# Patient Record
(demographics unavailable — no encounter records)

---

## 2025-03-05 NOTE — HISTORY OF PRESENT ILLNESS
[Pain Location] : pain [] : right knee [3] : a current pain level of 3/10 [Intermit.] : ~He/She~ states the symptoms seem to be intermittent [Bending] : worsened by bending [Walking] : worsened by walking [Rest] : relieved by rest [de-identified] : 03/05/2025: 74-year-old male presents as a new patient here with right knee pain. He states that he's had intermittent pain on the right knee for years, currently  has zero pain. He presents because of a recent flare-up. He was in [place] two weeks ago, he walked in 10k steps. After that time, he had a lot of knee pain, but mainly posterior, since that time it has resolved. Here, as a check-up on his knee, makes sure everything's okay. He's not been seen for it before. Of note, he does have a history of b/l knee replacements. He says his left hip was fantastic, but his right hip--he had some problems with it and feels like he has to walk with   with his toes out. He also states that he had multiple spine surgeries, which caused him a foot drop, so he does have a foot drop on the right side for which he  wears a specific brace in his shoe. He has a past history of hypertension and hyperlipidemia, and aside from that he's here to discuss his right knee. [de-identified] : buckling

## 2025-03-05 NOTE — END OF VISIT
[FreeTextEntry3] : Documented by Yue Otoole acting as a scribe for Dr. Juancarlos Dawson. 03/05/2025   All medical record entries made by the Scribe were at my, Dr. Juancarlos Dawson, direction and personally dictated by me on 03/05/2025. I have reviewed the chart and agree that the record accurately reflects my personal performance of the history, physical exam, assessment and plan. I have also personally directed, reviewed, and agreed with the chart.

## 2025-03-05 NOTE — DISCUSSION/SUMMARY
[de-identified] : iMP: 74-year-old male with right knee osteoarthritis. In the setting of partially iatrogenic limb length discrepancy and chronic right lower extremity neurologic weakness in the setting of diffuse degenerative spinal disease. - He really has no symptoms right now aside from the buckling, which is at least somewhat concerning to me.  -For the moment, I recommended that he begin physical therapy and home exercise program. - He has no pain and doesn't require pain medications. - We did review the possibility of introducing HA injections, which he elected to defer at this time, which I think is reasonable.  - He'll follow up in two months with no new x-rays needed to review clinical progress.

## 2025-03-05 NOTE — HISTORY OF PRESENT ILLNESS
[Pain Location] : pain [] : right knee [3] : a current pain level of 3/10 [Intermit.] : ~He/She~ states the symptoms seem to be intermittent [Bending] : worsened by bending [Walking] : worsened by walking [Rest] : relieved by rest [de-identified] : 03/05/2025: 74-year-old male presents as a new patient here with right knee pain. He states that he's had intermittent pain on the right knee for years, currently  has zero pain. He presents because of a recent flare-up. He was in [place] two weeks ago, he walked in 10k steps. After that time, he had a lot of knee pain, but mainly posterior, since that time it has resolved. Here, as a check-up on his knee, makes sure everything's okay. He's not been seen for it before. Of note, he does have a history of b/l knee replacements. He says his left hip was fantastic, but his right hip--he had some problems with it and feels like he has to walk with   with his toes out. He also states that he had multiple spine surgeries, which caused him a foot drop, so he does have a foot drop on the right side for which he  wears a specific brace in his shoe. He has a past history of hypertension and hyperlipidemia, and aside from that he's here to discuss his right knee. [de-identified] : buckling

## 2025-03-05 NOTE — PHYSICAL EXAM
[de-identified] : General appearance: well nourished and hydrated, pleasant, alert and oriented x 3, cooperative.   HEENT: normocephalic, EOM intact, wearing mask, external auditory canal clear.   Cardiovascular: no lower leg edema, no varicosities, dorsalis pedis pulses palpable and symmetric.   Lymphatics: no palpable lymphadenopathy, no lymphedema.   Neurologic: left ankle dorsiflexor strength 4+/5,  right ankle 2/5. Dermatologic: skin moist, warm, no rash.   Spine: cervical spine with normal lordosis and painless range of motion, thoracic spine with normal kyphosis and painless range of motion, lumbosacral spine with normal lordosis and painless range of motion.  No tenderness to palpation along midline spine and paraspinal musculature.  Sacroiliac joints nontender bilaterally. Negative SLR and crossed SLR tests bilaterally. Gait: The patient presents with a cane as well as an AFO inside of his right shoe. Without these devices, he demonstrates a cautious gait pattern, without specific antalgia. He does have an out-toeing gait pattern on the right, more so than the left, and he does demonstrate stepage on the right.   Limb lengths:  clinically right lower extremity approximately 8 to 10 millimeters shorter than left.   Right knee:  - Focal soft tissue swelling: none - Baker cyst: Small Baker's cysts - Ecchymosis: none - Erythema: none - Effusion: mild to moderate - Wounds: none - Alignment: varus malalignment - Tenderness: medial and lateral joint lines non tender to palpation. - He has no pain or crepitus with patellar compression test. - ROM: 5-130 - Collateral laxity: increased pseudo laxity to varus. - Cruciate laxity: Grade 2 lachman; approximately three to five millimeters of anterior and posterior drawer. - Popliteal angle (degrees): 45 - Quad strength: 5/5 - Extensor lag: none   [de-identified] : AP pelvis and 4 views of the bilateral knees (weightbearing AP, weightbearing Moreno, weightbearing lateral, and Sunrise) were interpreted by me and reviewed with the patient.  Location of imaging:  [facility] Date of exam: 03/05/2025  Pelvic alignment: normal  Bilateral hips demonstrate total hip replacements and positional components appear to be well-fixed and reasonable alignment without evidence of mechanical complication.   Right knee --  Alignment: varus  Arthritis: tricompartmental osteoarthritis bone-on-bone medially, K&L 4. Patellar height: borderline baja Patellar tracking: centrally with increase lateral tilt.   Left knee --  Alignment: mildly varus Arthritis: Mild to moderate medial and patella femoral osteoarthritis, K&L 2. Patellar height: normal Patellar tracking: centrally with increase lateral tilt.

## 2025-03-05 NOTE — DISCUSSION/SUMMARY
[de-identified] : iMP: 74-year-old male with right knee osteoarthritis. In the setting of partially iatrogenic limb length discrepancy and chronic right lower extremity neurologic weakness in the setting of diffuse degenerative spinal disease. - He really has no symptoms right now aside from the buckling, which is at least somewhat concerning to me.  -For the moment, I recommended that he begin physical therapy and home exercise program. - He has no pain and doesn't require pain medications. - We did review the possibility of introducing HA injections, which he elected to defer at this time, which I think is reasonable.  - He'll follow up in two months with no new x-rays needed to review clinical progress.

## 2025-03-05 NOTE — PHYSICAL EXAM
[de-identified] : General appearance: well nourished and hydrated, pleasant, alert and oriented x 3, cooperative.   HEENT: normocephalic, EOM intact, wearing mask, external auditory canal clear.   Cardiovascular: no lower leg edema, no varicosities, dorsalis pedis pulses palpable and symmetric.   Lymphatics: no palpable lymphadenopathy, no lymphedema.   Neurologic: left ankle dorsiflexor strength 4+/5,  right ankle 2/5. Dermatologic: skin moist, warm, no rash.   Spine: cervical spine with normal lordosis and painless range of motion, thoracic spine with normal kyphosis and painless range of motion, lumbosacral spine with normal lordosis and painless range of motion.  No tenderness to palpation along midline spine and paraspinal musculature.  Sacroiliac joints nontender bilaterally. Negative SLR and crossed SLR tests bilaterally. Gait: The patient presents with a cane as well as an AFO inside of his right shoe. Without these devices, he demonstrates a cautious gait pattern, without specific antalgia. He does have an out-toeing gait pattern on the right, more so than the left, and he does demonstrate stepage on the right.   Limb lengths:  clinically right lower extremity approximately 8 to 10 millimeters shorter than left.   Right knee:  - Focal soft tissue swelling: none - Baker cyst: Small Baker's cysts - Ecchymosis: none - Erythema: none - Effusion: mild to moderate - Wounds: none - Alignment: varus malalignment - Tenderness: medial and lateral joint lines non tender to palpation. - He has no pain or crepitus with patellar compression test. - ROM: 5-130 - Collateral laxity: increased pseudo laxity to varus. - Cruciate laxity: Grade 2 lachman; approximately three to five millimeters of anterior and posterior drawer. - Popliteal angle (degrees): 45 - Quad strength: 5/5 - Extensor lag: none   [de-identified] : AP pelvis and 4 views of the bilateral knees (weightbearing AP, weightbearing Moreno, weightbearing lateral, and Sunrise) were interpreted by me and reviewed with the patient.  Location of imaging:  [facility] Date of exam: 03/05/2025  Pelvic alignment: normal  Bilateral hips demonstrate total hip replacements and positional components appear to be well-fixed and reasonable alignment without evidence of mechanical complication.   Right knee --  Alignment: varus  Arthritis: tricompartmental osteoarthritis bone-on-bone medially, K&L 4. Patellar height: borderline baja Patellar tracking: centrally with increase lateral tilt.   Left knee --  Alignment: mildly varus Arthritis: Mild to moderate medial and patella femoral osteoarthritis, K&L 2. Patellar height: normal Patellar tracking: centrally with increase lateral tilt.

## 2025-05-07 NOTE — PHYSICAL EXAM
[de-identified] : General appearance: well nourished and hydrated, pleasant, alert and oriented x 3, cooperative.   HEENT: normocephalic, EOM intact, wearing mask, external auditory canal clear.   Cardiovascular: no lower leg edema, no varicosities, dorsalis pedis pulses palpable and symmetric.   Lymphatics: no palpable lymphadenopathy, no lymphedema.   Neurologic: sensation is normal, no muscle weakness in upper or lower extremities, patella tendon reflexes present and symmetric.   Dermatologic: skin moist, warm, no rash.   Spine: limited cervical and lumbar spinal motion. Gait: He presents today with a cane and an AFO on the right. Without these devices, he demonstrates a cautious gait pattern. He has steppage on the right. He does demonstrate a mild right varus thrust. He's not antalgic.  Right knee:  - Focal soft tissue swelling: none - Baker cyst: No palpable Baker's cysts - Ecchymosis: none - Erythema: none - Effusion: small to moderate - Wounds: none - Alignment: varus malalignment - Tenderness: medial and lateral joint lines non tender to palpation. Pes anserine bursa and Gerdy's tubercle non tender to palpation. Negative patellar compression test. The entire extensive mechanism is non tender to palpation. -- ROM: 5-135; no pain on terminal flexion or extension. - Collateral laxity: increased pseudo laxity of the varus, the varus deformity is mostly correctable. - Cruciate laxity: demonstrates a grade 2 lachman and 3mm of anterior and posterior drawer. - Popliteal angle (degrees): 70 - Quad strength: 5/5 - Extensor la - The overall quadriceps muscle bulk is still slightly diminished compared to the contralateral side.

## 2025-05-07 NOTE — DISCUSSION/SUMMARY
[de-identified] : IMP:74-year-old male with severe right knee medial compartment osteoarthritis, and ongoing RLE buckling in the setting of chronic neurologic deterioration and longstanding right-sided foot drop, as well as Iatrogenic limb length discrepancy with right leg shorter. - At this point, I think that the buckling is probably multifactorial. Thankfully, his knee arthritis continues to be completely pain-free, but I do think that it may be at least somewhat responsible for the buckling.  - The neuromuscular weakness, the limb length discrepancy, and his known lumbar and cervical stenosis may also have their parts to play. - At this point the patient is not interested in discussing any surgical modalities which I agree with. - I did offer him a trial of a right knee aspiration and cortisone injection today, which he declined.  - I recommended that he at least continue with a home exercise program for ongoing quadriceps strengthening, and that he follow up with me on an annual basis for routine surveillance x-rays of the right knee, and for clinical follow-up, he should call back earlier if experiencing any worsening pain or cielo falls.  - He'll continue to follow up his spinal issues with Dr. Schwab as well.

## 2025-05-07 NOTE — END OF VISIT
[FreeTextEntry3] : Documented by Yue Otoole acting as a scribe for Dr. Juancarlos Dawson. 05/07/2025  All medical record entries made by the Scribe were at my, Dr. Juancarlos Dawson, direction and personally dictated by me on 05/07/2025. I have reviewed the chart and agree that the record accurately reflects my personal performance of the history, physical exam, assessment and plan. I have also personally directed, reviewed, and agreed with the chart.

## 2025-05-07 NOTE — HISTORY OF PRESENT ILLNESS
[de-identified] : 05/07/2025: 73 y/o pt. presents for f/u of right knee osteoarthritis with episodic buckling. Patient reports that since the last time he saw us, he's doing just about the same. He still reports that in the morning when he goes to stretch, his leg will oftentimes buckle. Since he's supporting himself, it has not caused him to fall at any point. He states that at some parts throughout the day, his right knee will buckle when walking, but he uses a cane and it's not caused any major issues. He's not having any significant pain in the knee and has not required any sorts of treatment for that. He has been doing physical therapy, which has been aimed at strengthening his entire right lower extremity. He received a home exercise program at his last visit, which he states has many of the same exercises that he's doing in therapy.  03/05/2025: 74-year-old male presents as a new patient here with right knee pain. He states that he's had intermittent pain on the right knee for years, currently has zero pain. He presents because of a recent flare-up. He was in [place] two weeks ago, he walked in 10k steps. After that time, he had a lot of knee pain, but mainly posterior, since that time it has resolved. Here, as a check-up on his knee, makes sure everything's okay. He's not been seen for it before. Of note, he does have a history of b/l knee replacements. He says his left hip was fantastic, but his right hip--he had some problems with it and feels like he has to walk with  with his toes out. He also states that he had multiple spine surgeries, which caused him a foot drop, so he does have a foot drop on the right side for which he wears a specific brace in his shoe. He has a past history of hypertension and hyperlipidemia, and aside from that he's here to discuss his right knee.       CARIE HERNANDEZ presents with pain of the right knee. Pain levels include a current pain level of 3/10 and buckling.  He states the symptoms seem to be intermittent.    Modifying factors - worsened by bending and worsened by walking. Relieving factors include relieved by rest.